# Patient Record
Sex: FEMALE | Race: OTHER | HISPANIC OR LATINO | URBAN - METROPOLITAN AREA
[De-identification: names, ages, dates, MRNs, and addresses within clinical notes are randomized per-mention and may not be internally consistent; named-entity substitution may affect disease eponyms.]

---

## 2024-03-21 ENCOUNTER — EMERGENCY (EMERGENCY)
Facility: HOSPITAL | Age: 24
LOS: 1 days | Discharge: HOME | End: 2024-03-21
Attending: EMERGENCY MEDICINE
Payer: MEDICAID

## 2024-03-21 VITALS
DIASTOLIC BLOOD PRESSURE: 74 MMHG | OXYGEN SATURATION: 96 % | RESPIRATION RATE: 18 BRPM | HEART RATE: 68 BPM | TEMPERATURE: 98 F | SYSTOLIC BLOOD PRESSURE: 108 MMHG

## 2024-03-21 PROCEDURE — 72125 CT NECK SPINE W/O DYE: CPT | Mod: 26,MC

## 2024-03-21 PROCEDURE — 72100 X-RAY EXAM L-S SPINE 2/3 VWS: CPT | Mod: 26

## 2024-03-21 PROCEDURE — 99285 EMERGENCY DEPT VISIT HI MDM: CPT

## 2024-03-21 PROCEDURE — 73562 X-RAY EXAM OF KNEE 3: CPT | Mod: 26,LT

## 2024-03-21 RX ORDER — ACETAMINOPHEN 500 MG
650 TABLET ORAL ONCE
Refills: 0 | Status: COMPLETED | OUTPATIENT
Start: 2024-03-21 | End: 2024-03-21

## 2024-03-21 RX ADMIN — Medication 650 MILLIGRAM(S): at 22:57

## 2024-03-21 RX ADMIN — Medication 650 MILLIGRAM(S): at 23:57

## 2024-03-21 NOTE — ED ADULT NURSE NOTE - NSFALLUNIVINTERV_ED_ALL_ED
Bed/Stretcher in lowest position, wheels locked, appropriate side rails in place/Call bell, personal items and telephone in reach/Instruct patient to call for assistance before getting out of bed/chair/stretcher/Non-slip footwear applied when patient is off stretcher/[place] to call system/Physically safe environment - no spills, clutter or unnecessary equipment/Purposeful proactive rounding/Room/bathroom lighting operational, light cord in reach

## 2024-03-21 NOTE — ED ADULT NURSE NOTE - BOWEL SOUNDS RLQ
present [facility] Pregnancy And Lactation Text: The risk during pregnancy and breastfeeding is uncertain with this medication.

## 2024-03-22 VITALS
TEMPERATURE: 98 F | RESPIRATION RATE: 18 BRPM | SYSTOLIC BLOOD PRESSURE: 110 MMHG | OXYGEN SATURATION: 97 % | DIASTOLIC BLOOD PRESSURE: 68 MMHG | HEART RATE: 70 BPM

## 2024-03-22 LAB — HCG UR QL: NEGATIVE — SIGNIFICANT CHANGE UP

## 2024-03-22 PROCEDURE — 99284 EMERGENCY DEPT VISIT MOD MDM: CPT | Mod: 25

## 2024-03-22 PROCEDURE — 72100 X-RAY EXAM L-S SPINE 2/3 VWS: CPT

## 2024-03-22 PROCEDURE — 73562 X-RAY EXAM OF KNEE 3: CPT

## 2024-03-22 PROCEDURE — 72125 CT NECK SPINE W/O DYE: CPT | Mod: MC

## 2024-03-22 PROCEDURE — 81025 URINE PREGNANCY TEST: CPT

## 2024-03-22 RX ORDER — AZITHROMYCIN 500 MG/1
500 TABLET, FILM COATED ORAL ONCE
Refills: 0 | Status: COMPLETED | OUTPATIENT
Start: 2024-03-22 | End: 2024-03-22

## 2024-03-22 RX ADMIN — AZITHROMYCIN 500 MILLIGRAM(S): 500 TABLET, FILM COATED ORAL at 01:50

## 2024-03-22 NOTE — ED PROVIDER NOTE - CARE PLAN
Principal Discharge DX:	Contusion of lower back  Secondary Diagnosis:	Cervical sprain, initial encounter  Secondary Diagnosis:	Pneumonia   1

## 2024-03-22 NOTE — ED PROVIDER NOTE - OBJECTIVE STATEMENT
23-year-old woman, no significant past medical history, primarily presents after mechanical fall today where she injured her neck, lower back, and left knee after tripping and falling to the ground on a flat surface.  She denies any loss of consciousness/head injury/vomiting/dizziness/focal weakness or numbness.  Subsequently she also notes that about a month and a half ago she was admitted to a local hospital for about 4 days and treated for pneumonia/bronchitis, and since then she has had persistent dry cough, occasional fevers and shortness of breath.  At this time she does not have any shortness of breath.  Her last fever was about 6 days ago.  She had difficulty obtaining the prescribed antibiotics after discharge from the hospital so she has not taken them.  She does smoke marijuana and vapes.  Denies any chest pain.

## 2024-03-22 NOTE — ED PROVIDER NOTE - PATIENT PORTAL LINK FT
You can access the FollowMyHealth Patient Portal offered by [facility] by registering at the following website: [email or web address]. By joining [employer]’s FollowMyHealth portal, you will also be able to view your health information using other applications (apps) compatible with our system.

## 2024-03-22 NOTE — ED PROVIDER NOTE - CLINICAL SUMMARY MEDICAL DECISION MAKING FREE TEXT BOX
23-year-old woman, no significant past medical history, primarily presents after mechanical fall today where she injured her neck, lower back, and left knee after tripping and falling to the ground on a flat surface.  She denies any loss of consciousness/head injury/vomiting/dizziness/focal weakness or numbness.  Subsequently she also notes that about a month and a half ago she was admitted to a local hospital for about 4 days and treated for pneumonia/bronchitis--X-rays and CT of C-S show no acute traumatic injury, but incidental finding of possible pneumonia in upper lungs, which fits with her recent diagnosis.  Will start on azithromycin and advised PMD f/u and return precautions.  She also asked for gyn f/u info.

## 2024-03-22 NOTE — ED PROVIDER NOTE - NSFOLLOWUPINSTRUCTIONS_ED_ALL_ED_FT
English    Contusion  A contusion is a deep bruise. Contusions are the result of a blunt injury to tissues and muscle fibers under the skin. The injury causes bleeding under the skin. The skin over the contusion may turn blue, purple, or yellow. Minor injuries will give you a painless contusion, but more severe injuries cause contusions that can stay painful and swollen for a few weeks.    Follow these instructions at home:  Pay attention to any changes in your symptoms. Let your health care provider know about them. Take these actions to relieve your pain.    Managing pain, stiffness, and swelling    Bag of ice on a towel on the skin.  Use resting, icing, applying pressure (compression), and raising (elevating) the injured area. This is often called the RICE method.  Rest the injured area. Return to your normal activities as told by your health care provider. Ask your health care provider what activities are safe for you.  If directed, put ice on the injured area. To do this:  Put ice in a plastic bag.  Place a towel between your skin and the bag.  Leave the ice on for 20 minutes, 2–3 times a day.  If your skin turns bright red, remove the ice right away to prevent skin damage. The risk of skin damage is higher if you cannot feel pain, heat, or cold.  If directed, apply light compression to the injured area using an elastic bandage. Make sure the bandage is not wrapped too tightly. Remove and reapply the bandage as directed by your health care provider.  If possible, elevate the injured area above the level of your heart while you are sitting or lying down.  General instructions    Take over-the-counter and prescription medicines only as told by your health care provider.  Keep all follow-up visits. Your health care provider may want to see how your contusion is healing with treatment.  Contact a health care provider if:  Your symptoms do not improve after several days of treatment.  Your symptoms get worse.  You have difficulty moving the injured area.  Get help right away if:  You have severe pain.  You have numbness in a hand or foot.  Your hand or foot turns pale or cold.  This information is not intended to replace advice given to you by your health care provider. Make sure you discuss any questions you have with your health care provider.    Document Revised: 06/05/2023 Document Reviewed: 06/05/2023  [employer] Patient Education © 2024 [employer]  [employer] logo  Terms and Conditions  Privacy Policy  Editorial Policy  All content on this site: Copyright © 2024 [employer], its licensors, and contributors. All rights are reserved, including those for text and data mining, AI training, and similar technologies. For all open access content, the Creative Commons licensing terms apply.  Cookies are used by this site. To decline or learn more, visit our Cookies page.  [employer]            Log Out.  [employer] CareNotes®  :  [facility]        COMMUNITY ACQUIRED PNEUMONIA - Discharge Care    Community Acquired Pneumonia    WHAT YOU NEED TO KNOW:    Community-acquired pneumonia (CAP) is a lung infection that you get outside of a hospital or nursing home setting. Your lungs become inflamed and cannot work well. CAP may be caused by bacteria, viruses, or fungi.  The Lungs    DISCHARGE INSTRUCTIONS:    Seek care immediately if:    You are confused and cannot think clearly.    You have increased trouble breathing.    Your lips or fingernails turn gray or blue.  Call your doctor if:    Your symptoms do not get better, or they get worse.    You are urinating less, or not at all.    You have questions or concerns about your condition or care.  Medicines:    Medicines may be given to treat a bacterial, viral, or fungal infection. You may also be given medicines to dilate your bronchial tubes to help you breathe more easily.    Take your medicine as directed. Contact your healthcare provider if you think your medicine is not helping or if you have side effects. Tell your provider if you are allergic to any medicine. Keep a list of the medicines, vitamins, and herbs you take. Include the amounts, and when and why you take them. Bring the list or the pill bottles to follow-up visits. Carry your medicine list with you in case of an emergency.  Manage CAP at home:    Get plenty of rest. Rest helps your body heal.    Do not smoke or allow others to smoke around you. Nicotine and other chemicals in cigarettes and cigars can cause lung damage. Ask your healthcare provider for information if you currently smoke and need help to quit. E-cigarettes or smokeless tobacco still contain nicotine. Talk to your healthcare provider before you use these products.    Breathe warm, moist air. This helps loosen mucus. Loosely place a warm, wet washcloth over your nose and mouth. A room humidifier may also help make the air moist.    Gently tap your chest. This helps loosen mucus so it is easier to cough up.    Take deep breaths and cough. Deep breathing helps open the air passages in your lungs. Coughing helps bring up mucus from your lungs. Take a deep breath and hold the breath as long as you can. Then push the air out of your lungs with a deep, strong cough. Spit out any mucus you have coughed up. Take 10 deep breaths in a row every hour that you are awake. Remember to follow each deep breath with a cough.    Drink liquids as directed. Ask your healthcare provider how much liquid to drink each day and which liquids to drink. Liquids help make mucus thin and easier to get out of your body.  Prevent CAP:    Wash your hands often. Use soap for at least 20 seconds. Rinse with warm running water. Dry your hands with a clean towel or paper towel. Use hand  that contains alcohol if soap and water are not available. Wash your hands several times each day. Wash after you use the bathroom, change a child's diaper, and before you prepare or eat food. Do not touch your eyes, nose, or mouth without washing your hands first.  Handwashing      Cover a sneeze or cough. Use a tissue that covers your mouth and nose. Throw the tissue away in a trash can right away. Use the bend of your arm if a tissue is not available. Wash your hands well with soap and water or use a hand . Do not stand close to anyone who is sneezing or coughing.    Clean surfaces often. Clean doorknobs, countertops, cell phones, and other surfaces that are touched often. Use a disinfecting wipe, a single-use sponge, or a cloth you can wash and reuse. Use disinfecting  if you do not have wipes. You can create a disinfecting  by mixing 1 part bleach with 10 parts water.    Try to avoid people who have a cold or the flu. If you are sick, stay away from others as much as possible.    Ask about vaccines you may need. A pneumonia vaccine can help lower your risk for pneumonia. The vaccine may be recommended every 5 years, starting at age 65. Vaccines help lower the risk for infections that can become serious for a person who has pneumonia. Get a flu vaccine each year as soon as recommended, usually in September or October. Get a COVID-19 vaccine and booster as directed. Your healthcare provider can tell you if you should also get other vaccines, and when to get them.    Follow up with your doctor within 3 days or as directed: You may need another x-ray. Write down your questions so you remember to ask them during your visits.    © [employer] 1973, 2024    	  back to top            © [employer] 1973, 2024

## 2024-03-22 NOTE — ED PROVIDER NOTE - NSFOLLOWUPCLINICS_GEN_ALL_ED_FT
[facility]  Urogynecology  [address] [place] [address]  Phone: [phone]  Fax: [phone]  Follow Up Time: 7-10 Days

## 2024-03-23 RX ORDER — AZITHROMYCIN 500 MG/1
1 TABLET, FILM COATED ORAL
Qty: 4 | Refills: 0
Start: 2024-03-23 | End: 2024-03-26

## 2024-03-28 NOTE — ED POST DISCHARGE NOTE - DETAILS
phone not working. will send a certified letter. attempted to call emergency contact and not able to leave message either.